# Patient Record
Sex: FEMALE | Race: WHITE | NOT HISPANIC OR LATINO | Employment: OTHER | URBAN - METROPOLITAN AREA
[De-identification: names, ages, dates, MRNs, and addresses within clinical notes are randomized per-mention and may not be internally consistent; named-entity substitution may affect disease eponyms.]

---

## 2017-09-01 ENCOUNTER — HOSPITAL ENCOUNTER (OUTPATIENT)
Dept: CARDIOLOGY | Facility: HOSPITAL | Age: 52
Discharge: HOME | End: 2017-09-01
Attending: INTERNAL MEDICINE | Admitting: INTERNAL MEDICINE

## 2020-09-08 RX ORDER — TRAZODONE HYDROCHLORIDE 150 MG/1
150 TABLET ORAL NIGHTLY
COMMUNITY
Start: 2017-08-29

## 2020-09-08 RX ORDER — AMPICILLIN TRIHYDRATE 250 MG
CAPSULE ORAL
COMMUNITY
Start: 2017-08-29 | End: 2020-09-10

## 2020-09-08 RX ORDER — FLUTICASONE PROPIONATE 50 MCG
SPRAY, SUSPENSION (ML) NASAL
COMMUNITY
Start: 2017-08-29 | End: 2020-09-10

## 2020-09-08 RX ORDER — ALBUTEROL SULFATE 2.5 MG/3ML
2.5 SOLUTION RESPIRATORY (INHALATION)
COMMUNITY
Start: 2017-08-29

## 2020-09-08 RX ORDER — LISINOPRIL AND HYDROCHLOROTHIAZIDE 12.5; 1 MG/1; MG/1
TABLET ORAL
COMMUNITY
Start: 2017-08-29 | End: 2020-09-10

## 2020-09-08 RX ORDER — CYCLOBENZAPRINE HCL 10 MG
TABLET ORAL
COMMUNITY
Start: 2017-08-29 | End: 2020-09-10

## 2020-09-08 RX ORDER — PAROXETINE HYDROCHLORIDE 40 MG/1
40 TABLET, FILM COATED ORAL EVERY MORNING
COMMUNITY
Start: 2017-08-29

## 2020-09-08 RX ORDER — RANITIDINE 150 MG/1
CAPSULE ORAL
COMMUNITY
Start: 2017-08-29 | End: 2020-09-10

## 2020-09-08 RX ORDER — MIRTAZAPINE 45 MG/1
45 TABLET, FILM COATED ORAL NIGHTLY
COMMUNITY
Start: 2017-08-29

## 2020-09-08 RX ORDER — ASPIRIN 325 MG
325 TABLET ORAL DAILY
COMMUNITY
Start: 2017-08-29 | End: 2021-07-28

## 2020-09-08 RX ORDER — DIVALPROEX SODIUM 250 MG/1
TABLET, DELAYED RELEASE ORAL
COMMUNITY
Start: 2017-08-29 | End: 2020-09-10

## 2020-09-08 RX ORDER — FUROSEMIDE 20 MG/1
TABLET ORAL
COMMUNITY
Start: 2017-08-29 | End: 2020-09-10

## 2020-09-08 RX ORDER — FENTANYL 50 UG/H
PATCH TRANSDERMAL
COMMUNITY
Start: 2017-08-29 | End: 2020-09-10

## 2020-09-08 RX ORDER — CARVEDILOL 6.25 MG/1
TABLET ORAL
COMMUNITY
Start: 2017-08-29 | End: 2020-09-10

## 2020-09-08 RX ORDER — HYDROXYZINE PAMOATE 50 MG/1
50 CAPSULE ORAL 2 TIMES DAILY
COMMUNITY
Start: 2017-08-29

## 2020-09-08 RX ORDER — ALBUTEROL SULFATE 90 UG/1
1 AEROSOL, METERED RESPIRATORY (INHALATION) EVERY 4 HOURS PRN
COMMUNITY
Start: 2017-08-29

## 2020-09-08 RX ORDER — METFORMIN HYDROCHLORIDE EXTENDED-RELEASE TABLETS 500 MG/1
1000 TABLET, FILM COATED, EXTENDED RELEASE ORAL 2 TIMES DAILY WITH MEALS
COMMUNITY
Start: 2017-08-29

## 2020-09-10 ENCOUNTER — OFFICE VISIT (OUTPATIENT)
Dept: CARDIOLOGY | Facility: CLINIC | Age: 55
End: 2020-09-10

## 2020-09-10 VITALS
DIASTOLIC BLOOD PRESSURE: 86 MMHG | OXYGEN SATURATION: 96 % | HEART RATE: 100 BPM | HEIGHT: 66 IN | BODY MASS INDEX: 30.37 KG/M2 | SYSTOLIC BLOOD PRESSURE: 142 MMHG | WEIGHT: 189 LBS

## 2020-09-10 DIAGNOSIS — R94.31 ABNORMAL EKG: ICD-10-CM

## 2020-09-10 DIAGNOSIS — E78.5 DYSLIPIDEMIA: ICD-10-CM

## 2020-09-10 DIAGNOSIS — E11.9 TYPE 2 DIABETES MELLITUS WITHOUT COMPLICATION, WITHOUT LONG-TERM CURRENT USE OF INSULIN (HCC): ICD-10-CM

## 2020-09-10 DIAGNOSIS — R06.09 DYSPNEA ON EXERTION: ICD-10-CM

## 2020-09-10 DIAGNOSIS — F17.200 SMOKER: ICD-10-CM

## 2020-09-10 DIAGNOSIS — I20.0 UNSTABLE ANGINA (HCC): Primary | ICD-10-CM

## 2020-09-10 DIAGNOSIS — I10 ESSENTIAL HYPERTENSION: ICD-10-CM

## 2020-09-10 PROCEDURE — 99204 OFFICE O/P NEW MOD 45 MIN: CPT | Performed by: INTERNAL MEDICINE

## 2020-09-10 PROCEDURE — 93000 ELECTROCARDIOGRAM COMPLETE: CPT | Performed by: INTERNAL MEDICINE

## 2020-09-10 RX ORDER — LISINOPRIL AND HYDROCHLOROTHIAZIDE 20; 12.5 MG/1; MG/1
1 TABLET ORAL DAILY
COMMUNITY
Start: 2020-09-01

## 2020-09-10 RX ORDER — PRAVASTATIN SODIUM 40 MG
40 TABLET ORAL
COMMUNITY
Start: 2020-09-01

## 2020-09-10 RX ORDER — TIZANIDINE 4 MG/1
4 TABLET ORAL 2 TIMES DAILY
COMMUNITY
Start: 2020-09-03

## 2020-09-10 RX ORDER — CETIRIZINE HYDROCHLORIDE 10 MG/1
10 TABLET ORAL DAILY
COMMUNITY
End: 2021-07-28

## 2020-09-10 NOTE — H&P (VIEW-ONLY)
Cardiology Consult Note    Patient Identification:  Name: Carlotta Herrmann  Age: 55 y.o.  Sex: female  :  1965  MRN: [id number]             Requesting Physician : Dr. Alvin Gordon    Reason for Consultation / Chief Complaint : Chest pain    History of Present Illness:      This is a 55-year-old with past medical history of     # hypertension, hyperlipidemia  # type 2 diabetes  # asthma, COPD, cigarette smoker  # allergy to codeine, morphine, Crestor  # hydrocephalus,  shunt 89  # back surgery, breast reduction surgery, cholecystectomy  # Positive family history of CAD in mother,brother, sister     Here for chest pain evaluation.  Patient has been having recurrent, substernal, chest pain, which is like a pressure and sometimes like a stabbing through the chest to the left axillary side, relieved with nitroglycerin, gets worse with activities relieved with resting.  Nitroglycerin gave her headaches.  Patient has history of  shunt and headaches and pain in the back of the neck and right shoulder from our  shunt in the past.  Patient has dyspnea on exertion relieved with rest which she thinks is due to her smoking and COPD.  Patient's arterial blood pressure is 142/86, heart rate 100, O2 sat of 96% on room air..   Patient has history of eczema and took Crestor was unsure of Crestor cross the worsening of eczema but is tolerating statins now.  Echo from 2017 shows EF of 55 to 60% with mild MR.  Labs from 2017 revealed CMP with a sodium of 131, albumin of 3.3      Assessment:      -Recurrent prolonged chest pain with exertion relieved with rest, abnormal EKG, new onset consistent with unstable angina  -Shortness of breath and dyspnea on exertion  -Positive family history of premature CAD in mother brother sister  -Diabetes, hypertension, dyslipidemia, cigarette smoker      Recommendations / Plan:        We will continue aspirin and carvedilol  Start her on long-acting nitrates  Schedule for  cardiac catheter  Risk benefits alternatives explained  Counseled on smoking cessation  Follow-up with PMD for diabetes control  Reviewed abnormal EKG results with patient           Diagnosis Plan   1. Unstable angina (CMS/HCC)  COVID PRE-OP / PRE-PROCEDURE SCREENING ORDER (NO ISOLATION) - Swab, Nasopharynx    Case Request Cath Lab: Left Heart Cath    CBC (No Diff)    Basic Metabolic Panel    Protime-INR    XR Chest 2 View   2. Abnormal EKG  COVID PRE-OP / PRE-PROCEDURE SCREENING ORDER (NO ISOLATION) - Swab, Nasopharynx    Case Request Cath Lab: Left Heart Cath    CBC (No Diff)    Basic Metabolic Panel    Protime-INR    XR Chest 2 View   3. Dyspnea on exertion  COVID PRE-OP / PRE-PROCEDURE SCREENING ORDER (NO ISOLATION) - Swab, Nasopharynx    Case Request Cath Lab: Left Heart Cath    CBC (No Diff)    Basic Metabolic Panel    Protime-INR    XR Chest 2 View   4. Essential hypertension  COVID PRE-OP / PRE-PROCEDURE SCREENING ORDER (NO ISOLATION) - Swab, Nasopharynx    Case Request Cath Lab: Left Heart Cath    CBC (No Diff)    Basic Metabolic Panel    Protime-INR    XR Chest 2 View   5. Dyslipidemia  COVID PRE-OP / PRE-PROCEDURE SCREENING ORDER (NO ISOLATION) - Swab, Nasopharynx    Case Request Cath Lab: Left Heart Cath    CBC (No Diff)    Basic Metabolic Panel    Protime-INR    XR Chest 2 View   6. Type 2 diabetes mellitus without complication, without long-term current use of insulin (CMS/[facility])  COVID PRE-OP / PRE-PROCEDURE SCREENING ORDER (NO ISOLATION) - Swab, Nasopharynx    Case Request Cath Lab: Left Heart Cath    CBC (No Diff)    Basic Metabolic Panel    Protime-INR    XR Chest 2 View   7. Smoker  COVID PRE-OP / PRE-PROCEDURE SCREENING ORDER (NO ISOLATION) - Swab, Nasopharynx    Case Request Cath Lab: Left Heart Cath    CBC (No Diff)    Basic Metabolic Panel    Protime-INR    XR Chest 2 View              Past Medical History:  Past Medical History:   Diagnosis Date   • Asthma    • COPD (chronic obstructive  pulmonary disease) (CMS/[facility])    • Diabetes mellitus (CMS/[facility])    • Hyperlipidemia    • Hypertension      Past Surgical History:  Past Surgical History:   Procedure Laterality Date   • BACK SURGERY     • BILATERAL BREAST REDUCTION     • CARDIAC CATHETERIZATION        Allergies:  Allergies   Allergen Reactions   • Codeine Dizziness   • Morphine Unknown - High Severity   • Rosuvastatin Calcium Unknown - High Severity     Home Meds:  Current Meds:     Current Outpatient Medications:   •  albuterol (PROVENTIL) (2.5 MG/3ML) 0.083% nebulizer solution, ALBUTEROL SULFATE (2.5 MG/3ML) 0.083% NEBU, Disp: , Rfl:   •  albuterol sulfate HFA (ProAir HFA) 108 (90 Base) MCG/ACT inhaler, PROAIR  (90 Base) MCG/ACT AERS, Disp: , Rfl:   •  aspirin 325 MG tablet, ASPIRIN 325 MG TABS, Disp: , Rfl:   •  cetirizine (zyrTEC) 10 MG tablet, Take 10 mg by mouth Daily., Disp: , Rfl:   •  hydrOXYzine pamoate (Vistaril) 50 MG capsule, VISTARIL 50 MG CAPS, Disp: , Rfl:   •  lisinopril-hydrochlorothiazide (PRINZIDE,ZESTORETIC) 20-12.5 MG per tablet, Take 1 tablet by mouth Daily., Disp: , Rfl:   •  metFORMIN (FORTAMET) 500 MG (OSM) 24 hr tablet, 1,000 mg 2 (Two) Times a Day With Meals., Disp: , Rfl:   •  mirtazapine (REMERON) 45 MG tablet, MIRTAZAPINE 45 MG TABS, Disp: , Rfl:   •  PARoxetine (Paxil) 40 MG tablet, PAXIL 40 MG TABS, Disp: , Rfl:   •  pravastatin (PRAVACHOL) 40 MG tablet, Take 40 mg by mouth every night at bedtime., Disp: , Rfl:   •  tiZANidine (ZANAFLEX) 4 MG tablet, TAKE 1 TABLET BY MOUTH TWICE DAILY AS NEEDED FOR SPASMS, Disp: , Rfl:   •  topiramate (TOPAMAX) 200 MG tablet, 2 (Two) Times a Day., Disp: , Rfl:   •  traZODone (DESYREL) 150 MG tablet, TRAZODONE  MG TABS, Disp: , Rfl:   Social History:   Social History     Tobacco Use   • Smoking status: Current Every Day Smoker   • Smokeless tobacco: Never Used   Substance Use Topics   • Alcohol use: Not on file      Family History:  Family History   Problem Relation Age of  "Onset   • Heart disease Mother    • Heart disease Sister    • Heart disease Brother         Review of Systems : Review of Systems   Constitution: Negative for weight gain and weight loss.   HENT: Negative for nosebleeds.    Cardiovascular: Positive for chest pain and dyspnea on exertion. Negative for near-syncope, palpitations and syncope.   Respiratory: Positive for shortness of breath. Negative for cough and hemoptysis.    Endocrine: Negative for cold intolerance, heat intolerance, polydipsia and polyphagia.   Hematologic/Lymphatic: Does not bruise/bleed easily.   Skin: Negative for rash.   Musculoskeletal: Negative for arthritis and back pain.   Gastrointestinal: Negative for diarrhea, hematochezia, melena, nausea and vomiting.   Genitourinary: Negative for dysuria and hematuria.   Neurological: Positive for dizziness and headaches. Negative for seizures.   Psychiatric/Behavioral: Negative for altered mental status.                Constitutional:  Heart Rate:  [100] 100  BP: (142)/(86) 142/86    Physical Exam   /86 (BP Location: Left arm, Patient Position: Sitting, Cuff Size: Large Adult)   Pulse 100   Ht 167.6 cm (66\")   Wt 85.7 kg (189 lb)   SpO2 96%   BMI 30.51 kg/m²   Physical Exam  General:  Appears in no acute distress  Eyes: Sclera is anicteric,  conjunctiva is clear   HEENT:  No JVD. Thyroid not visibly enlarged. No mucosal pallor or cyanosis  Respiratory: Respirations regular and unlabored at rest.  Bilaterally good breath sounds, with good air entry in all fields. No crackles, rubs or wheezes auscultated  Cardiovascular: S1,S2 Regular rate and rhythm. No murmur, rub or gallop auscultated. No pretibial pitting edema  Gastrointestinal: Abdomen soft, flat, non tender. Bowel sounds present.   Musculoskeletal:  No abnormal movements  Extremities: No digital clubbing or cyanosis  Skin: Color pink. Skin warm and dry to touch. No rashes  No xanthoma  Neuro: Alert and awake, no lateralizing deficits " appreciated    Cardiographics  ECG: EKG tracing was  personally reviewed by me     ECG 12 Lead  Date/Time: 9/10/2020 2:19 PM  Performed by: Bam Daniels MD  Authorized by: Bam Daniels MD   Comparison: compared with previous ECG from 9/1/2017  Comparison to previous ECG: EKG done today reviewed by me shows sinus rhythm at the rate of 99 bpm with PVCs and ST segment depression in inferior leads downward, new compared to EKG from 9/1/2017                 Imaging  Chest X-ray:   Imaging Results (Last 24 Hours)     ** No results found for the last 24 hours. **          Lab Review: I have reviewed the labs              @[email or web address]@                  Bam Daniels MD  9/10/2020, 14:16      EMR Dragon/Transcription:   Dictated utilizing Dragon dictation

## 2020-09-10 NOTE — PROGRESS NOTES
Cardiology Consult Note    Patient Identification:  Name: Carlotta Herrmann  Age: 55 y.o.  Sex: female  :  1965  MRN: [id number]             Requesting Physician : Dr. Alvin Gordon    Reason for Consultation / Chief Complaint : Chest pain    History of Present Illness:      This is a 55-year-old with past medical history of     # hypertension, hyperlipidemia  # type 2 diabetes  # asthma, COPD, cigarette smoker  # allergy to codeine, morphine, Crestor  # hydrocephalus,  shunt 89  # back surgery, breast reduction surgery, cholecystectomy  # Positive family history of CAD in mother,brother, sister     Here for chest pain evaluation.  Patient has been having recurrent, substernal, chest pain, which is like a pressure and sometimes like a stabbing through the chest to the left axillary side, relieved with nitroglycerin, gets worse with activities relieved with resting.  Nitroglycerin gave her headaches.  Patient has history of  shunt and headaches and pain in the back of the neck and right shoulder from our  shunt in the past.  Patient has dyspnea on exertion relieved with rest which she thinks is due to her smoking and COPD.  Patient's arterial blood pressure is 142/86, heart rate 100, O2 sat of 96% on room air..   Patient has history of eczema and took Crestor was unsure of Crestor cross the worsening of eczema but is tolerating statins now.  Echo from 2017 shows EF of 55 to 60% with mild MR.  Labs from 2017 revealed CMP with a sodium of 131, albumin of 3.3      Assessment:      -Recurrent prolonged chest pain with exertion relieved with rest, abnormal EKG, new onset consistent with unstable angina  -Shortness of breath and dyspnea on exertion  -Positive family history of premature CAD in mother brother sister  -Diabetes, hypertension, dyslipidemia, cigarette smoker      Recommendations / Plan:        We will continue aspirin and carvedilol  Start her on long-acting nitrates  Schedule for  cardiac catheter  Risk benefits alternatives explained  Counseled on smoking cessation  Follow-up with PMD for diabetes control  Reviewed abnormal EKG results with patient           Diagnosis Plan   1. Unstable angina (CMS/HCC)  COVID PRE-OP / PRE-PROCEDURE SCREENING ORDER (NO ISOLATION) - Swab, Nasopharynx    Case Request Cath Lab: Left Heart Cath    CBC (No Diff)    Basic Metabolic Panel    Protime-INR    XR Chest 2 View   2. Abnormal EKG  COVID PRE-OP / PRE-PROCEDURE SCREENING ORDER (NO ISOLATION) - Swab, Nasopharynx    Case Request Cath Lab: Left Heart Cath    CBC (No Diff)    Basic Metabolic Panel    Protime-INR    XR Chest 2 View   3. Dyspnea on exertion  COVID PRE-OP / PRE-PROCEDURE SCREENING ORDER (NO ISOLATION) - Swab, Nasopharynx    Case Request Cath Lab: Left Heart Cath    CBC (No Diff)    Basic Metabolic Panel    Protime-INR    XR Chest 2 View   4. Essential hypertension  COVID PRE-OP / PRE-PROCEDURE SCREENING ORDER (NO ISOLATION) - Swab, Nasopharynx    Case Request Cath Lab: Left Heart Cath    CBC (No Diff)    Basic Metabolic Panel    Protime-INR    XR Chest 2 View   5. Dyslipidemia  COVID PRE-OP / PRE-PROCEDURE SCREENING ORDER (NO ISOLATION) - Swab, Nasopharynx    Case Request Cath Lab: Left Heart Cath    CBC (No Diff)    Basic Metabolic Panel    Protime-INR    XR Chest 2 View   6. Type 2 diabetes mellitus without complication, without long-term current use of insulin (CMS/[facility])  COVID PRE-OP / PRE-PROCEDURE SCREENING ORDER (NO ISOLATION) - Swab, Nasopharynx    Case Request Cath Lab: Left Heart Cath    CBC (No Diff)    Basic Metabolic Panel    Protime-INR    XR Chest 2 View   7. Smoker  COVID PRE-OP / PRE-PROCEDURE SCREENING ORDER (NO ISOLATION) - Swab, Nasopharynx    Case Request Cath Lab: Left Heart Cath    CBC (No Diff)    Basic Metabolic Panel    Protime-INR    XR Chest 2 View              Past Medical History:  Past Medical History:   Diagnosis Date   • Asthma    • COPD (chronic obstructive  pulmonary disease) (CMS/[facility])    • Diabetes mellitus (CMS/[facility])    • Hyperlipidemia    • Hypertension      Past Surgical History:  Past Surgical History:   Procedure Laterality Date   • BACK SURGERY     • BILATERAL BREAST REDUCTION     • CARDIAC CATHETERIZATION        Allergies:  Allergies   Allergen Reactions   • Codeine Dizziness   • Morphine Unknown - High Severity   • Rosuvastatin Calcium Unknown - High Severity     Home Meds:  Current Meds:     Current Outpatient Medications:   •  albuterol (PROVENTIL) (2.5 MG/3ML) 0.083% nebulizer solution, ALBUTEROL SULFATE (2.5 MG/3ML) 0.083% NEBU, Disp: , Rfl:   •  albuterol sulfate HFA (ProAir HFA) 108 (90 Base) MCG/ACT inhaler, PROAIR  (90 Base) MCG/ACT AERS, Disp: , Rfl:   •  aspirin 325 MG tablet, ASPIRIN 325 MG TABS, Disp: , Rfl:   •  cetirizine (zyrTEC) 10 MG tablet, Take 10 mg by mouth Daily., Disp: , Rfl:   •  hydrOXYzine pamoate (Vistaril) 50 MG capsule, VISTARIL 50 MG CAPS, Disp: , Rfl:   •  lisinopril-hydrochlorothiazide (PRINZIDE,ZESTORETIC) 20-12.5 MG per tablet, Take 1 tablet by mouth Daily., Disp: , Rfl:   •  metFORMIN (FORTAMET) 500 MG (OSM) 24 hr tablet, 1,000 mg 2 (Two) Times a Day With Meals., Disp: , Rfl:   •  mirtazapine (REMERON) 45 MG tablet, MIRTAZAPINE 45 MG TABS, Disp: , Rfl:   •  PARoxetine (Paxil) 40 MG tablet, PAXIL 40 MG TABS, Disp: , Rfl:   •  pravastatin (PRAVACHOL) 40 MG tablet, Take 40 mg by mouth every night at bedtime., Disp: , Rfl:   •  tiZANidine (ZANAFLEX) 4 MG tablet, TAKE 1 TABLET BY MOUTH TWICE DAILY AS NEEDED FOR SPASMS, Disp: , Rfl:   •  topiramate (TOPAMAX) 200 MG tablet, 2 (Two) Times a Day., Disp: , Rfl:   •  traZODone (DESYREL) 150 MG tablet, TRAZODONE  MG TABS, Disp: , Rfl:   Social History:   Social History     Tobacco Use   • Smoking status: Current Every Day Smoker   • Smokeless tobacco: Never Used   Substance Use Topics   • Alcohol use: Not on file      Family History:  Family History   Problem Relation Age of  "Onset   • Heart disease Mother    • Heart disease Sister    • Heart disease Brother         Review of Systems : Review of Systems   Constitution: Negative for weight gain and weight loss.   HENT: Negative for nosebleeds.    Cardiovascular: Positive for chest pain and dyspnea on exertion. Negative for near-syncope, palpitations and syncope.   Respiratory: Positive for shortness of breath. Negative for cough and hemoptysis.    Endocrine: Negative for cold intolerance, heat intolerance, polydipsia and polyphagia.   Hematologic/Lymphatic: Does not bruise/bleed easily.   Skin: Negative for rash.   Musculoskeletal: Negative for arthritis and back pain.   Gastrointestinal: Negative for diarrhea, hematochezia, melena, nausea and vomiting.   Genitourinary: Negative for dysuria and hematuria.   Neurological: Positive for dizziness and headaches. Negative for seizures.   Psychiatric/Behavioral: Negative for altered mental status.                Constitutional:  Heart Rate:  [100] 100  BP: (142)/(86) 142/86    Physical Exam   /86 (BP Location: Left arm, Patient Position: Sitting, Cuff Size: Large Adult)   Pulse 100   Ht 167.6 cm (66\")   Wt 85.7 kg (189 lb)   SpO2 96%   BMI 30.51 kg/m²   Physical Exam  General:  Appears in no acute distress  Eyes: Sclera is anicteric,  conjunctiva is clear   HEENT:  No JVD. Thyroid not visibly enlarged. No mucosal pallor or cyanosis  Respiratory: Respirations regular and unlabored at rest.  Bilaterally good breath sounds, with good air entry in all fields. No crackles, rubs or wheezes auscultated  Cardiovascular: S1,S2 Regular rate and rhythm. No murmur, rub or gallop auscultated. No pretibial pitting edema  Gastrointestinal: Abdomen soft, flat, non tender. Bowel sounds present.   Musculoskeletal:  No abnormal movements  Extremities: No digital clubbing or cyanosis  Skin: Color pink. Skin warm and dry to touch. No rashes  No xanthoma  Neuro: Alert and awake, no lateralizing deficits " appreciated    Cardiographics  ECG: EKG tracing was  personally reviewed by me     ECG 12 Lead  Date/Time: 9/10/2020 2:19 PM  Performed by: Bam Daniels MD  Authorized by: Bam Daniels MD   Comparison: compared with previous ECG from 9/1/2017  Comparison to previous ECG: EKG done today reviewed by me shows sinus rhythm at the rate of 99 bpm with PVCs and ST segment depression in inferior leads downward, new compared to EKG from 9/1/2017                 Imaging  Chest X-ray:   Imaging Results (Last 24 Hours)     ** No results found for the last 24 hours. **          Lab Review: I have reviewed the labs              @[email or web address]@                  Bam Daniels MD  9/10/2020, 14:16      EMR Dragon/Transcription:   Dictated utilizing Dragon dictation

## 2020-09-11 ENCOUNTER — TELEPHONE (OUTPATIENT)
Dept: CARDIOLOGY | Facility: CLINIC | Age: 55
End: 2020-09-11

## 2020-09-11 RX ORDER — ISOSORBIDE MONONITRATE 30 MG/1
30 TABLET, EXTENDED RELEASE ORAL DAILY
Qty: 90 TABLET | Refills: 3 | Status: SHIPPED | OUTPATIENT
Start: 2020-09-11 | End: 2020-09-11

## 2020-09-11 RX ORDER — ISOSORBIDE MONONITRATE 30 MG/1
30 TABLET, EXTENDED RELEASE ORAL DAILY
COMMUNITY

## 2020-09-11 RX ORDER — ISOSORBIDE MONONITRATE 30 MG/1
30 TABLET, EXTENDED RELEASE ORAL DAILY
COMMUNITY
End: 2020-09-11 | Stop reason: SDUPTHER

## 2020-09-11 NOTE — TELEPHONE ENCOUNTER
Pt called asking about new med.  I spoke to Dr Daniels     Sending in Imdur 30mg daily   [facility]

## 2020-09-14 ENCOUNTER — LAB (OUTPATIENT)
Dept: LAB | Facility: HOSPITAL | Age: 55
End: 2020-09-14

## 2020-09-14 ENCOUNTER — HOSPITAL ENCOUNTER (OUTPATIENT)
Dept: GENERAL RADIOLOGY | Facility: HOSPITAL | Age: 55
Discharge: HOME | End: 2020-09-14

## 2020-09-14 DIAGNOSIS — R94.31 ABNORMAL EKG: ICD-10-CM

## 2020-09-14 DIAGNOSIS — E11.9 TYPE 2 DIABETES MELLITUS WITHOUT COMPLICATION, WITHOUT LONG-TERM CURRENT USE OF INSULIN (HCC): ICD-10-CM

## 2020-09-14 DIAGNOSIS — F17.200 SMOKER: ICD-10-CM

## 2020-09-14 DIAGNOSIS — R06.09 DYSPNEA ON EXERTION: ICD-10-CM

## 2020-09-14 DIAGNOSIS — I20.0 UNSTABLE ANGINA (HCC): ICD-10-CM

## 2020-09-14 DIAGNOSIS — E78.5 DYSLIPIDEMIA: ICD-10-CM

## 2020-09-14 DIAGNOSIS — I10 ESSENTIAL HYPERTENSION: ICD-10-CM

## 2020-09-14 LAB
ANION GAP SERPL CALCULATED.3IONS-SCNC: 10.6 MMOL/L (ref 5–15)
BUN SERPL-MCNC: 16 MG/DL (ref 6–20)
BUN/CREAT SERPL: 17.6 (ref 7–25)
CALCIUM SPEC-SCNC: 9.5 MG/DL (ref 8.6–10.5)
CHLORIDE SERPL-SCNC: 102 MMOL/L (ref 98–107)
CO2 SERPL-SCNC: 23.4 MMOL/L (ref 22–29)
CREAT SERPL-MCNC: 0.91 MG/DL (ref 0.57–1)
DEPRECATED RDW RBC AUTO: 50.7 FL (ref 37–54)
ERYTHROCYTE [DISTWIDTH] IN BLOOD BY AUTOMATED COUNT: 15.6 % (ref 12.3–15.4)
GFR SERPL CREATININE-BSD FRML MDRD: 64 ML/MIN/1.73
GLUCOSE SERPL-MCNC: 114 MG/DL (ref 65–99)
HCT VFR BLD AUTO: 43.6 % (ref 34–46.6)
HGB BLD-MCNC: 14.3 G/DL (ref 12–15.9)
INR PPP: 0.94 (ref 0.93–1.1)
MCH RBC QN AUTO: 29 PG (ref 26.6–33)
MCHC RBC AUTO-ENTMCNC: 32.8 G/DL (ref 31.5–35.7)
MCV RBC AUTO: 88.4 FL (ref 79–97)
PLATELET # BLD AUTO: 305 10*3/MM3 (ref 140–450)
PMV BLD AUTO: 9.7 FL (ref 6–12)
POTASSIUM SERPL-SCNC: 4.2 MMOL/L (ref 3.5–5.2)
PROTHROMBIN TIME: 10.3 SECONDS (ref 9.6–11.7)
RBC # BLD AUTO: 4.93 10*6/MM3 (ref 3.77–5.28)
SODIUM SERPL-SCNC: 136 MMOL/L (ref 136–145)
WBC # BLD AUTO: 6.89 10*3/MM3 (ref 3.4–10.8)

## 2020-09-14 PROCEDURE — 80048 BASIC METABOLIC PNL TOTAL CA: CPT

## 2020-09-14 PROCEDURE — C9803 HOPD COVID-19 SPEC COLLECT: HCPCS

## 2020-09-14 PROCEDURE — U0004 COV-19 TEST NON-CDC HGH THRU: HCPCS

## 2020-09-14 PROCEDURE — 85027 COMPLETE CBC AUTOMATED: CPT

## 2020-09-14 PROCEDURE — 85610 PROTHROMBIN TIME: CPT

## 2020-09-14 PROCEDURE — 71046 X-RAY EXAM CHEST 2 VIEWS: CPT

## 2020-09-14 PROCEDURE — 36415 COLL VENOUS BLD VENIPUNCTURE: CPT

## 2020-09-15 LAB — SARS-COV-2 RNA NOSE QL NAA+PROBE: NOT DETECTED

## 2020-09-16 ENCOUNTER — HOSPITAL ENCOUNTER (OUTPATIENT)
Facility: HOSPITAL | Age: 55
Setting detail: HOSPITAL OUTPATIENT SURGERY
Discharge: HOME | End: 2020-09-16
Attending: INTERNAL MEDICINE | Admitting: INTERNAL MEDICINE

## 2020-09-16 VITALS
BODY MASS INDEX: 32.59 KG/M2 | SYSTOLIC BLOOD PRESSURE: 117 MMHG | OXYGEN SATURATION: 90 % | DIASTOLIC BLOOD PRESSURE: 63 MMHG | HEIGHT: 64 IN | WEIGHT: 190.92 LBS | TEMPERATURE: 97.7 F | HEART RATE: 72 BPM | RESPIRATION RATE: 16 BRPM

## 2020-09-16 DIAGNOSIS — I20.0 UNSTABLE ANGINA (HCC): ICD-10-CM

## 2020-09-16 DIAGNOSIS — I10 ESSENTIAL HYPERTENSION: ICD-10-CM

## 2020-09-16 DIAGNOSIS — F17.200 SMOKER: ICD-10-CM

## 2020-09-16 DIAGNOSIS — R06.09 DYSPNEA ON EXERTION: ICD-10-CM

## 2020-09-16 DIAGNOSIS — E78.5 DYSLIPIDEMIA: ICD-10-CM

## 2020-09-16 DIAGNOSIS — R94.31 ABNORMAL EKG: ICD-10-CM

## 2020-09-16 DIAGNOSIS — E11.9 TYPE 2 DIABETES MELLITUS WITHOUT COMPLICATION, WITHOUT LONG-TERM CURRENT USE OF INSULIN (HCC): ICD-10-CM

## 2020-09-16 LAB — GLUCOSE BLDC GLUCOMTR-MCNC: 141 MG/DL (ref 70–105)

## 2020-09-16 PROCEDURE — 82962 GLUCOSE BLOOD TEST: CPT

## 2020-09-16 PROCEDURE — 0 IOPAMIDOL PER 1 ML: Performed by: INTERNAL MEDICINE

## 2020-09-16 PROCEDURE — 25010000002 FENTANYL CITRATE (PF) 100 MCG/2ML SOLUTION: Performed by: INTERNAL MEDICINE

## 2020-09-16 PROCEDURE — C1769 GUIDE WIRE: HCPCS | Performed by: INTERNAL MEDICINE

## 2020-09-16 PROCEDURE — 25010000002 HEPARIN (PORCINE) PER 1000 UNITS: Performed by: INTERNAL MEDICINE

## 2020-09-16 PROCEDURE — 25010000002 MIDAZOLAM PER 1 MG: Performed by: INTERNAL MEDICINE

## 2020-09-16 PROCEDURE — 99152 MOD SED SAME PHYS/QHP 5/>YRS: CPT | Performed by: INTERNAL MEDICINE

## 2020-09-16 PROCEDURE — C1894 INTRO/SHEATH, NON-LASER: HCPCS | Performed by: INTERNAL MEDICINE

## 2020-09-16 PROCEDURE — 99153 MOD SED SAME PHYS/QHP EA: CPT | Performed by: INTERNAL MEDICINE

## 2020-09-16 PROCEDURE — 93458 L HRT ARTERY/VENTRICLE ANGIO: CPT | Performed by: INTERNAL MEDICINE

## 2020-09-16 RX ORDER — MIDAZOLAM HYDROCHLORIDE 1 MG/ML
INJECTION INTRAMUSCULAR; INTRAVENOUS AS NEEDED
Status: DISCONTINUED | OUTPATIENT
Start: 2020-09-16 | End: 2020-09-16 | Stop reason: HOSPADM

## 2020-09-16 RX ORDER — NITROGLYCERIN 5 MG/ML
INJECTION, SOLUTION INTRAVENOUS AS NEEDED
Status: DISCONTINUED | OUTPATIENT
Start: 2020-09-16 | End: 2020-09-16 | Stop reason: HOSPADM

## 2020-09-16 RX ORDER — HEPARIN SODIUM 1000 [USP'U]/ML
INJECTION, SOLUTION INTRAVENOUS; SUBCUTANEOUS AS NEEDED
Status: DISCONTINUED | OUTPATIENT
Start: 2020-09-16 | End: 2020-09-16 | Stop reason: HOSPADM

## 2020-09-16 RX ORDER — LIDOCAINE HYDROCHLORIDE 20 MG/ML
INJECTION, SOLUTION INFILTRATION; PERINEURAL AS NEEDED
Status: DISCONTINUED | OUTPATIENT
Start: 2020-09-16 | End: 2020-09-16 | Stop reason: HOSPADM

## 2020-09-16 RX ORDER — FENTANYL CITRATE 50 UG/ML
INJECTION, SOLUTION INTRAMUSCULAR; INTRAVENOUS AS NEEDED
Status: DISCONTINUED | OUTPATIENT
Start: 2020-09-16 | End: 2020-09-16 | Stop reason: HOSPADM

## 2020-09-16 RX ORDER — NICARDIPINE HYDROCHLORIDE 2.5 MG/ML
INJECTION INTRAVENOUS AS NEEDED
Status: DISCONTINUED | OUTPATIENT
Start: 2020-09-16 | End: 2020-09-16 | Stop reason: HOSPADM

## 2020-09-16 RX ORDER — SODIUM CHLORIDE 9 MG/ML
100 INJECTION, SOLUTION INTRAVENOUS CONTINUOUS
Status: DISCONTINUED | OUTPATIENT
Start: 2020-09-16 | End: 2020-09-16 | Stop reason: HOSPADM

## 2020-09-16 RX ORDER — ACETAMINOPHEN 325 MG/1
650 TABLET ORAL EVERY 4 HOURS PRN
Status: DISCONTINUED | OUTPATIENT
Start: 2020-09-16 | End: 2020-09-16 | Stop reason: HOSPADM

## 2020-09-16 RX ORDER — ACETAMINOPHEN 325 MG/1
650 TABLET ORAL EVERY 4 HOURS PRN
Status: DISCONTINUED | OUTPATIENT
Start: 2020-09-16 | End: 2020-09-16 | Stop reason: SDUPTHER

## 2020-09-16 RX ORDER — SODIUM CHLORIDE 9 MG/ML
250 INJECTION, SOLUTION INTRAVENOUS ONCE AS NEEDED
Status: DISCONTINUED | OUTPATIENT
Start: 2020-09-16 | End: 2020-09-16 | Stop reason: HOSPADM

## 2020-09-16 RX ORDER — SODIUM CHLORIDE 9 MG/ML
100 INJECTION, SOLUTION INTRAVENOUS CONTINUOUS
Status: DISCONTINUED | OUTPATIENT
Start: 2020-09-16 | End: 2020-09-16 | Stop reason: SDUPTHER

## 2020-09-16 NOTE — DISCHARGE INSTRUCTIONS
Post Cath Instructions      1) Drink plenty of fluids for the next 24 hours.  This helps to eliminate the dye used in your procedure through urination.  You may resume a normal diet; however, try to avoid foods that would cause gas or constipation.    2) Sedative medication given to you during your catheterization may decrease your judgement and reaction time for up to 24-48 hours.  Therefore:  a. DO NOT drive or operate hazardous machinery (48 hours)  b. DO NOT consume alcoholic beverages  c. DO NOT make any important/legal decisions  d. Have someone stay with you for at least 24 hours    3) To allow proper healing and prevent bleeding, the following activities are to be strictly avoided for the next 24-48 hours:  a. Excessive bending at wound site  b. Straining (anything that would tense up muscles around the affected puncture site)  c. Lifting objects greater than 5 pounds, pushing, or pulling for 5 days  i. For Arm Cases:  1. No flexing at the puncture site, such as hammering, golfing, bowling, or swinging any objects    Keep the puncture site clean and dry.  You may remove the dressing tomorrow and replace it with a band-aid for at least one additional day.  Gently clean the site with mild soap and water.  No scrubbing/rubbing and lightly pat the area dry.  Showers are acceptable; however, avoid submerging in water (tub baths, hot tubs, swimming pools, dishwater, etc…) for at least one week.  The site should be completely healed before resuming these activities to reduce the risk of infection.  Check the site often.  Watch for signs and symptoms of infection and notify your physician if any of the following occur:  d. Bleeding or an increase in swelling at the puncture site  e. Fever  f. Increased soreness around puncture site  g. Foul odor or significant drainage from the puncture site  h. Swelling, redness, or warmth at the puncture site    **A bruise or small “pea sized” lump under the skin at the puncture  site is not unusual.  This should disappear within 3-4 weeks.**  4) CONTACT YOUR PHYSICIAN OR CALL 911 IF YOU EXPERIENCE ANY OF THE FOLLOWING:  a. Increased angina (chest pain) or frequent sensations of pressure, burning, pain, or other discomfort in the chest, arm, jaws, or stomach  b. Lightheadedness, dizziness, faint feeling, sweating, or difficulty breathing  c. Odd sensation changes like numbness, tingling, coldness, or pain in the arm or leg in which the catheter was inserted  d. Limb in which the catheter was inserted becomes pale/bluish in color    IMPORTANT:  Although this occurs very rarely, if you should develop bright red or excessive bleeding, feel a “pop” inside at the insertion site, or notice a sudden increase in swelling larger than a walnut, you should call 911.  Hold continuous firm pressure to the access site until emergency personnel arrive.  It is best if someone else can do this for you.

## 2021-07-27 ENCOUNTER — TELEPHONE (OUTPATIENT)
Dept: CARDIOLOGY | Facility: CLINIC | Age: 56
End: 2021-07-27

## 2021-07-27 NOTE — TELEPHONE ENCOUNTER
DR. MARTINEZ  PHONE [phone] TONE  [phone]  EGD/COLONOSCOPY 7/30/21  OK TO HOLD ASA 7 DAYS PRIOR?  LOV 9/10/2020. PATIENT WILL NEED APT.

## 2021-07-28 ENCOUNTER — OFFICE VISIT (OUTPATIENT)
Dept: CARDIOLOGY | Facility: CLINIC | Age: 56
End: 2021-07-28

## 2021-07-28 VITALS
OXYGEN SATURATION: 97 % | DIASTOLIC BLOOD PRESSURE: 83 MMHG | BODY MASS INDEX: 33.97 KG/M2 | HEART RATE: 101 BPM | HEIGHT: 64 IN | SYSTOLIC BLOOD PRESSURE: 123 MMHG | WEIGHT: 199 LBS

## 2021-07-28 DIAGNOSIS — E11.9 TYPE 2 DIABETES MELLITUS WITHOUT COMPLICATION, WITHOUT LONG-TERM CURRENT USE OF INSULIN (HCC): ICD-10-CM

## 2021-07-28 DIAGNOSIS — I25.10 CORONARY ARTERY DISEASE INVOLVING NATIVE CORONARY ARTERY OF NATIVE HEART WITHOUT ANGINA PECTORIS: Primary | ICD-10-CM

## 2021-07-28 DIAGNOSIS — Z01.810 PREOPERATIVE CARDIOVASCULAR EXAMINATION: ICD-10-CM

## 2021-07-28 DIAGNOSIS — I10 ESSENTIAL HYPERTENSION: ICD-10-CM

## 2021-07-28 DIAGNOSIS — E78.5 DYSLIPIDEMIA: ICD-10-CM

## 2021-07-28 PROCEDURE — 99214 OFFICE O/P EST MOD 30 MIN: CPT | Performed by: INTERNAL MEDICINE

## 2021-07-28 PROCEDURE — 93000 ELECTROCARDIOGRAM COMPLETE: CPT | Performed by: INTERNAL MEDICINE

## 2021-07-28 RX ORDER — GABAPENTIN 100 MG/1
CAPSULE ORAL 3 TIMES DAILY
COMMUNITY
Start: 2021-07-27

## 2021-07-28 RX ORDER — ASPIRIN 81 MG/1
81 TABLET ORAL DAILY
Qty: 90 TABLET | Refills: 3 | Status: SHIPPED | OUTPATIENT
Start: 2021-07-28

## 2021-07-28 RX ORDER — IBUPROFEN 800 MG/1
800 TABLET ORAL EVERY 6 HOURS PRN
COMMUNITY

## 2021-07-28 NOTE — PROGRESS NOTES
Subjective:     Encounter Date:07/28/2021      Patient ID: Carlotta Herrmann is a 56 y.o. female.    Chief Complaint : Cardiac cath follow-up.  Preop for scope.  Follow-up for hypertension dyslipidemia diabetes CAD.  History of Present Illness      This is a 56-year-old with past medical history of     # hypertension, hyperlipidemia  # type 2 diabetes  # asthma, COPD, cigarette smoker  # allergy to codeine, morphine, Crestor  # hydrocephalus,  shunt 89  # back surgery, breast reduction surgery, cholecystectomy  # Positive family history of CAD in mother,brother, sister     Here for follow-up..  Patient has been having recurrent, substernal, chest pain, which is like a pressure and sometimes like a stabbing through the chest to the left axillary side, relieved with nitroglycerin, gets worse with activities relieved with resting.  Nitroglycerin gave her headaches.  Patient has history of  shunt and headaches and pain in the back of the neck and right shoulder from our  shunt in the past.Patient has dyspnea on exertion relieved with rest which she thinks is due to her smoking and COPD.  Patient underwent cardiac cath 916/20 which revealed 30% distal LAD.  Patient is having issues with blood in her stool is to have EGD and colonoscopy done.  Patient's arterial blood pressure is 123/83, heart rate 100, O2 sat of 97% on room air..   Patient has history of eczema and took Crestor was unsure of Crestor cross the worsening of eczema but is tolerating statins now.  Echo from 9/11/2017 shows EF of 55 to 60% with mild MR.  Labs from 9/1/2017 revealed CMP with a sodium of 131, albumin of 3.3.  Labs from 9/14/2020 reveal Chem-7 with glucose of 114, normal CBC.      Assessment:      -Preop for scope, blood in stool  -Noncritical CAD  -Positive family history of premature CAD in mother brother sister  -Diabetes, hypertension, dyslipidemia, cigarette smoker      Recommendations / Plan:        Reviewed EKG results with  patient  Change aspirin to 81 mg.  Continue medical management with isosorbide, lisinopril hydrochlorothiazide, pravastatin to help with hypertension, CAD, dyslipidemia as tolerated.  Counseled on smoking cessation  Follow-up with PMD for diabetes control  EGD is low risk procedure should be okay from cardiovascular standpoint.  Reviewed BMI over 34 counseled on weight loss diet and exercise.                  ECG 12 Lead    Date/Time: 7/28/2021 4:17 PM  Performed by: Bam Daniels MD  Authorized by: Bam Daniels MD   Comparison: compared with previous ECG from 9/10/2020  Comparison to previous ECG: EKG done today reviewed/interpreted by me reveals sinus rhythm with rate of 97 bpm with baseline artifact and LVH.  No new change compared to EKG from 9/10/2020              The following portions of the patient's history were reviewed and updated as appropriate: allergies, current medications, past family history, past medical history, past social history, past surgical history and problem list.    Assessment:         [facility]     Diagnosis Plan   1. Coronary artery disease involving native coronary artery of native heart without angina pectoris  Comprehensive Metabolic Panel    Lipid Panel   2. Essential hypertension  Comprehensive Metabolic Panel    Lipid Panel   3. Type 2 diabetes mellitus without complication, without long-term current use of insulin (CMS/Formerly [facility])  Comprehensive Metabolic Panel    Lipid Panel   4. Dyslipidemia  Comprehensive Metabolic Panel    Lipid Panel   5. Preoperative cardiovascular examination            Plan:               Past Medical History:  Past Medical History:   Diagnosis Date   • Asthma    • COPD (chronic obstructive pulmonary disease) (CMS/HCC)    • Diabetes mellitus (CMS/[facility]ly [facility])    • Hyperlipidemia    • Hypertension      Past Surgical History:  Past Surgical History:   Procedure Laterality Date   • BACK SURGERY     • BILATERAL BREAST REDUCTION     • CARDIAC  CATHETERIZATION     • CARDIAC CATHETERIZATION N/A 9/16/2020    Procedure: Left Heart Cath;  Surgeon: Bam Daniels MD;  Location: [facility] CATH INVASIVE LOC[facility];  Service: Cardiovascular;  Laterality: N/A;   • SHUNT REVISION        Allergies:  Allergies   Allergen Reactions   • Codeine Dizziness   • Morphine Unknown - High Severity   • Rosuvastatin Calcium Unknown - High Severity     Home Meds:  Current Meds:     Current Outpatient Medications:   •  albuterol (PROVENTIL) (2.5 MG/3ML) 0.083% nebulizer solution, Take 2.5 mg by nebulization 4 (Four) Times a Day., Disp: , Rfl:   •  albuterol sulfate HFA (ProAir HFA) 108 (90 Base) MCG/ACT inhaler, Inhale 1 puff Every 4 (Four) Hours As Needed., Disp: , Rfl:   •  calcium citrate-vitamin d (CITRACAL) 200-250 MG-UNIT tablet tablet, Take  by mouth Daily., Disp: , Rfl:   •  fluticasone-salmeterol (Advair Diskus) 250-50 MCG/DOSE DISKUS, Inhale 2 (Two) Times a Day., Disp: , Rfl:   •  gabapentin (NEURONTIN) 100 MG capsule, 3 (Three) Times a Day., Disp: , Rfl:   •  hydrOXYzine pamoate (Vistaril) 50 MG capsule, Take 50 mg by mouth 2 (two) times a day., Disp: , Rfl:   •  ibuprofen (ADVIL,MOTRIN) 800 MG tablet, Take 800 mg by mouth Every 6 (Six) Hours As Needed for Mild Pain ., Disp: , Rfl:   •  lisinopril-hydrochlorothiazide (PRINZIDE,ZESTORETIC) 20-12.5 MG per tablet, Take 1 tablet by mouth Daily., Disp: , Rfl:   •  metFORMIN (FORTAMET) 500 MG (OSM) 24 hr tablet, 1,000 mg 2 (Two) Times a Day With Meals., Disp: , Rfl:   •  mirtazapine (REMERON) 45 MG tablet, Take 45 mg by mouth Every Night., Disp: , Rfl:   •  PARoxetine (Paxil) 40 MG tablet, Take 40 mg by mouth Every Morning., Disp: , Rfl:   •  POTASSIUM GLUCONATE PO, Take  by mouth., Disp: , Rfl:   •  pravastatin (PRAVACHOL) 40 MG tablet, Take 40 mg by mouth every night at bedtime., Disp: , Rfl:   •  tiZANidine (ZANAFLEX) 4 MG tablet, Take 4 mg by mouth 2 (two) times a day., Disp: , Rfl:   •  topiramate (TOPAMAX) 200 MG  "tablet, Take 200 mg by mouth 2 (Two) Times a Day., Disp: , Rfl:   •  traZODone (DESYREL) 150 MG tablet, Take 150 mg by mouth Every Night., Disp: , Rfl:   •  aspirin (aspirin) 81 MG EC tablet, Take 1 tablet by mouth Daily., Disp: 90 tablet, Rfl: 3  •  isosorbide mononitrate (IMDUR) 30 MG 24 hr tablet, Take 30 mg by mouth Daily., Disp: , Rfl:   Social History:   Social History     Tobacco Use   • Smoking status: Current Every Day Smoker     Packs/day: 1.00     Years: 20.00     Pack years: 20.00     Types: Cigarettes   • Smokeless tobacco: Never Used   Substance Use Topics   • Alcohol use: Never      Family History:  Family History   Problem Relation Age of Onset   • Heart disease Mother    • Heart disease Sister    • Heart disease Brother               Review of Systems   Constitutional: Negative for malaise/fatigue.   Cardiovascular: Negative for chest pain, leg swelling and palpitations.   Respiratory: Positive for shortness of breath.    Skin: Negative for rash.   Neurological: Positive for dizziness and light-headedness. Negative for numbness.     All other systems are negative         Objective:     Physical Exam  /83   Pulse 101   Ht 161.3 cm (63.5\")   Wt 90.3 kg (199 lb)   SpO2 97%   BMI 34.70 kg/m²   General:  Appears in no acute distress, pleasant obese  Eyes: Sclera is anicteric,  conjunctiva is clear   HEENT:  No JVD.  No carotid bruits  Respiratory: Respirations regular and unlabored at rest.  Clear to auscultation  Cardiovascular: S1,S2 Regular rate and rhythm. No murmur, rub or gallop auscultated.   Gastrointestinal: Abdomen nondistended, soft  Extremities: No digital clubbing or cyanosis, no edema  Skin: Color pink. Skin warm and dry to touch. No rashes  No xanthoma  Neuro: Alert and awake, no lateralizing deficits appreciated    Lab Reviewed:                  "

## 2021-09-15 ENCOUNTER — TELEPHONE (OUTPATIENT)
Dept: CARDIOLOGY | Facility: CLINIC | Age: 56
End: 2021-09-15

## 2022-07-29 ENCOUNTER — TELEPHONE (OUTPATIENT)
Dept: CARDIOLOGY | Facility: CLINIC | Age: 57
End: 2022-07-29

## 2024-08-18 NOTE — PROGRESS NOTES
Subjective:     Encounter Date:08/19/2024      Patient ID: Carlotta Herrmann is a 59 y.o. female.    Chief Complaint and history of present illness:     Follow-up for hypertension dyslipidemia diabetes CAD.    History of Present Illness  :     Ms. Carlotta Herrmann has past medical history of     # hypertension, hyperlipidemia  # type 2 diabetes  # asthma, COPD, cigarette smoker  # allergy to codeine, morphine, Crestor  # hydrocephalus,  shunt 89  # back surgery, breast reduction surgery, cholecystectomy  # Positive family history of CAD in mother,brother, sister     Here for follow-up..  Patient recently was in hospital in [place] for elevated blood pressure and just darted amlodipine.  Blood pressure has been better since then.  Patient has labile blood pressure and has some dyspnea on exertion.  Also has intermittent chest pain.  No aggravating or relieving factors.    Patient's arterial blood pressure is 138/64, heart rate 87, O2 sat of 95% on room air.  .   Patient has history of eczema and took Crestor was unsure of Crestor cross the worsening of eczema but is tolerating statins now.  Echo from 9/11/2017 shows EF of 55 to 60% with mild MR.  Labs from 9/1/2017 revealed CMP with a sodium of 131, albumin of 3.3.  Labs from 9/14/2020 reveal Chem-7 with glucose of 114, normal CBC.        Assessment:      -Chest pain   -Dyspnea on exertion  -Hypertension  -Noncritical CAD  -Positive family history of premature CAD in mother brother sister  -Diabetes, hypertension, dyslipidemia, cigarette smoker        Recommendations / Plan:         Reviewed EKG results with patient.  Patient is having dyspnea on exertion and chest pain and labile blood pressure.  Will schedule for stress test.  Patient cannot walk on treadmill due to deconditioning and musculoskeletal reasons.  Will do Lexiscan Cardiolite.  Will check labs.  Will check echocardiogram.  Will follow-up with nurse practitioner clinic for results.  Continue medical  management  Continue medical management with isosorbide, lisinopril hydrochlorothiazide, amlodipine, pravastatin to help with hypertension, CAD, dyslipidemia as tolerated.  Counseled on smoking cessation  Follow-up with PMD for diabetes control  Patient's last office visit was over 3 years ago.  Advised patient to check blood pressure at home.                    ECG 12 Lead    Date/Time: 8/19/2024 8:18 PM  Performed by: Bam Daniels MD    Authorized by: Bam Daniels MD  Comparison: compared with previous ECG from 7/28/2021  Comparison to previous ECG: EKG done today reviewed/interpreted by me reveals sinus rhythm with rate of 81 bpm with no significant change compared to EKG from 7/28/2021          Copied text in this portion of the note has been reviewed and is accurate as of 8/19/2024  The following portions of the patient's history were reviewed and updated as appropriate: allergies, current medications, past family history, past medical history, past social history, past surgical history and problem list.    Assessment:         [facility]       Diagnosis Plan   1. Essential hypertension  Comprehensive Metabolic Panel    Lipid Panel    TSH    Adult Transthoracic Echo Complete W/ Cont if Necessary Per Protocol    BNP    Stress Test With Myocardial Perfusion One Day      2. Dyslipidemia  Comprehensive Metabolic Panel    Lipid Panel    TSH    Adult Transthoracic Echo Complete W/ Cont if Necessary Per Protocol    BNP    Stress Test With Myocardial Perfusion One Day      3. Type 2 diabetes mellitus without complication, without long-term current use of insulin  Comprehensive Metabolic Panel    Lipid Panel    TSH    Adult Transthoracic Echo Complete W/ Cont if Necessary Per Protocol    BNP    Stress Test With Myocardial Perfusion One Day      4. Precordial pain  Adult Transthoracic Echo Complete W/ Cont if Necessary Per Protocol    Stress Test With Myocardial Perfusion One Day      5. Dyspnea on  exertion  Adult Transthoracic Echo Complete W/ Cont if Necessary Per Protocol    Stress Test With Myocardial Perfusion One Day             Plan:               Past Medical History:  Past Medical History:   Diagnosis Date    Asthma     COPD (chronic obstructive pulmonary disease)     Diabetes mellitus     Hyperlipidemia     Hypertension      Past Surgical History:  Past Surgical History:   Procedure Laterality Date    BACK SURGERY      BILATERAL BREAST REDUCTION      CARDIAC CATHETERIZATION      CARDIAC CATHETERIZATION N/A 9/16/2020    Procedure: Left Heart Cath;  Surgeon: Bam Daniels MD;  Location: [facility] CATH INVASIVE LOCATION;  Service: Cardiovascular;  Laterality: N/A;    SHUNT REVISION        Allergies:  Allergies   Allergen Reactions    Codeine Dizziness    Morphine Unknown - High Severity    Rosuvastatin Calcium Unknown - High Severity     Home Meds:  Current Meds:     Current Outpatient Medications:     albuterol (PROVENTIL) (2.5 MG/3ML) 0.083% nebulizer solution, Take 2.5 mg by nebulization 4 (Four) Times a Day., Disp: , Rfl:     albuterol sulfate HFA (ProAir HFA) 108 (90 Base) MCG/ACT inhaler, Inhale 1 puff Every 4 (Four) Hours As Needed., Disp: , Rfl:     amLODIPine (NORVASC) 2.5 MG tablet, Take 1 tablet by mouth Daily., Disp: , Rfl:     amLODIPine (NORVASC) 5 MG tablet, Take 1 tablet by mouth., Disp: , Rfl:     aspirin (aspirin) 81 MG EC tablet, Take 1 tablet by mouth Daily., Disp: 90 tablet, Rfl: 3    atorvastatin (LIPITOR) 40 MG tablet, Take 1 tablet by mouth Every Evening., Disp: , Rfl:     calcium citrate-vitamin d (CITRACAL) 200-250 MG-UNIT tablet tablet, Take  by mouth Daily., Disp: , Rfl:     fexofenadine (ALLEGRA) 180 MG tablet, Take 1 tablet by mouth Daily., Disp: , Rfl:     fluticasone-salmeterol (Advair Diskus) 250-50 MCG/DOSE DISKUS, Inhale 2 (Two) Times a Day., Disp: , Rfl:     gabapentin (NEURONTIN) 600 MG tablet, Take 1 tablet by mouth 2 (Two) Times a Day., Disp: , Rfl:      "ibuprofen (ADVIL,MOTRIN) 800 MG tablet, Take 1 tablet by mouth Every 6 (Six) Hours As Needed for Mild Pain., Disp: , Rfl:     Lantus 100 UNIT/ML injection, INJECT 32 UNITS SUBCUTANEOUSLY TWICE DAILY . DO NOT EXCEED 40 PER 24 HOURS, Disp: , Rfl:     lisinopril-hydrochlorothiazide (PRINZIDE,ZESTORETIC) 20-12.5 MG per tablet, Take 1 tablet by mouth Daily., Disp: , Rfl:     mirtazapine (REMERON) 30 MG tablet, Take 1 tablet by mouth every night at bedtime., Disp: , Rfl:     omeprazole (priLOSEC) 40 MG capsule, Take 1 capsule by mouth Daily., Disp: , Rfl:     PARoxetine (Paxil) 40 MG tablet, Take 1 tablet by mouth Every Morning., Disp: , Rfl:     QUEtiapine (SEROquel) 100 MG tablet, Take 1 tablet by mouth Daily., Disp: , Rfl:     ReliOn Insulin Syringe 31G X 15/64\" 0.5 ML misc, USE 1 SYRINCE AS DIRECTED TWICE DAILY, Disp: , Rfl:   Social History:   Social History     Tobacco Use    Smoking status: Every Day     Current packs/day: 1.00     Average packs/day: 1 pack/day for 20.0 years (20.0 ttl pk-yrs)     Types: Cigarettes     Passive exposure: Current    Smokeless tobacco: Never   Substance Use Topics    Alcohol use: Never      Family History:  Family History   Problem Relation Age of Onset    Heart disease Mother     Heart disease Sister     Heart disease Brother               Review of Systems   Cardiovascular:  Negative for chest pain, leg swelling and palpitations.   Respiratory:  Negative for shortness of breath.    Neurological:  Negative for dizziness and numbness.     All other systems are negative         Objective:     Physical Exam  /64 (BP Location: Left arm, Patient Position: Sitting, Cuff Size: Large Adult)   Pulse 87   Ht 167.6 cm (66\")   Wt 93.9 kg (207 lb)   SpO2 95%   BMI 33.41 kg/m²   General:  Appears in no acute distress  Eyes: Sclera is anicteric,  conjunctiva is clear   HEENT:  No JVD.  No carotid bruits  Respiratory: Respirations regular and unlabored at rest.  Clear to " "auscultation  Cardiovascular: S1,S2 Regular rate and rhythm. .   Extremities: No digital clubbing or cyanosis, no edema  Skin: Color pink. Skin warm and dry to touch. No rashes  No xanthoma  Neuro: Alert and awake.    Lab Reviewed:         Bam Daniels MD  8/19/2024 20:16 EDT      EMR Dragon/Transcription:   \"Dictated utilizing Dragon dictation\".        "

## 2024-08-19 ENCOUNTER — OFFICE VISIT (OUTPATIENT)
Dept: CARDIOLOGY | Facility: CLINIC | Age: 59
End: 2024-08-19
Payer: MEDICARE

## 2024-08-19 VITALS
WEIGHT: 207 LBS | DIASTOLIC BLOOD PRESSURE: 64 MMHG | HEART RATE: 87 BPM | HEIGHT: 66 IN | BODY MASS INDEX: 33.27 KG/M2 | SYSTOLIC BLOOD PRESSURE: 138 MMHG | OXYGEN SATURATION: 95 %

## 2024-08-19 DIAGNOSIS — I10 ESSENTIAL HYPERTENSION: Primary | ICD-10-CM

## 2024-08-19 DIAGNOSIS — E78.5 DYSLIPIDEMIA: ICD-10-CM

## 2024-08-19 DIAGNOSIS — R07.2 PRECORDIAL PAIN: ICD-10-CM

## 2024-08-19 DIAGNOSIS — E11.9 TYPE 2 DIABETES MELLITUS WITHOUT COMPLICATION, WITHOUT LONG-TERM CURRENT USE OF INSULIN: Chronic | ICD-10-CM

## 2024-08-19 DIAGNOSIS — R06.09 DYSPNEA ON EXERTION: ICD-10-CM

## 2024-08-19 PROCEDURE — 1160F RVW MEDS BY RX/DR IN RCRD: CPT | Performed by: INTERNAL MEDICINE

## 2024-08-19 PROCEDURE — 93000 ELECTROCARDIOGRAM COMPLETE: CPT | Performed by: INTERNAL MEDICINE

## 2024-08-19 PROCEDURE — 99204 OFFICE O/P NEW MOD 45 MIN: CPT | Performed by: INTERNAL MEDICINE

## 2024-08-19 PROCEDURE — 1159F MED LIST DOCD IN RCRD: CPT | Performed by: INTERNAL MEDICINE

## 2024-08-19 PROCEDURE — 3075F SYST BP GE 130 - 139MM HG: CPT | Performed by: INTERNAL MEDICINE

## 2024-08-19 PROCEDURE — 3078F DIAST BP <80 MM HG: CPT | Performed by: INTERNAL MEDICINE

## 2024-08-19 RX ORDER — INSULIN GLARGINE 100 [IU]/ML
INJECTION, SOLUTION SUBCUTANEOUS
COMMUNITY
Start: 2024-07-26

## 2024-08-19 RX ORDER — AMLODIPINE BESYLATE 2.5 MG/1
2.5 TABLET ORAL DAILY
COMMUNITY
Start: 2024-08-06

## 2024-08-19 RX ORDER — MIRTAZAPINE 30 MG/1
30 TABLET, FILM COATED ORAL
COMMUNITY
Start: 2024-07-26

## 2024-08-19 RX ORDER — SYRINGE AND NEEDLE,INSULIN,1ML 31GX15/64"
SYRINGE, EMPTY DISPOSABLE MISCELLANEOUS
COMMUNITY
Start: 2024-07-26

## 2024-08-19 RX ORDER — AMLODIPINE BESYLATE 5 MG/1
5 TABLET ORAL
COMMUNITY
Start: 2024-07-26

## 2024-08-19 RX ORDER — GABAPENTIN 600 MG/1
600 TABLET ORAL 2 TIMES DAILY
COMMUNITY

## 2024-08-19 RX ORDER — FEXOFENADINE HCL 180 MG/1
1 TABLET ORAL DAILY
COMMUNITY
Start: 2024-08-06

## 2024-08-19 RX ORDER — OMEPRAZOLE 40 MG/1
1 CAPSULE, DELAYED RELEASE ORAL DAILY
COMMUNITY
Start: 2024-07-26

## 2024-08-19 RX ORDER — ATORVASTATIN CALCIUM 40 MG/1
40 TABLET, FILM COATED ORAL EVERY EVENING
COMMUNITY
Start: 2024-07-26

## 2024-08-19 RX ORDER — QUETIAPINE FUMARATE 100 MG/1
1 TABLET, FILM COATED ORAL DAILY
COMMUNITY
Start: 2024-07-26

## (undated) DEVICE — CATH DIAG IMPULSE FR4 6F 100CM

## (undated) DEVICE — GLIDESHEATH SLENDER ACCESS KIT: Brand: GLIDESHEATH SLENDER

## (undated) DEVICE — CATH DIAG IMPULSE FL4 6F 100CM

## (undated) DEVICE — BND COMPR ZEPHYR VASC LG

## (undated) DEVICE — GW EMR FIX EXCHG J STD .035 3MM 260CM

## (undated) DEVICE — PK TRY HEART CATH 50

## (undated) DEVICE — CATH DIAG IMPULSE PIG .056 6F 110CM

## (undated) DEVICE — GW DIAG EMERALD HEPCOAT MOVE JTIP STD .035 3MM 150CM

## (undated) DEVICE — RADIFOCUS OPTITORQUE ANGIOGRAPHIC CATHETER: Brand: OPTITORQUE

## (undated) DEVICE — SKIN PREP TRAY W/CHG: Brand: MEDLINE INDUSTRIES, INC.

## (undated) DEVICE — DRAPE, RADIAL, STERILE: Brand: MEDLINE

## (undated) DEVICE — PINNACLE INTRODUCER SHEATH: Brand: PINNACLE